# Patient Record
Sex: MALE | Race: BLACK OR AFRICAN AMERICAN | URBAN - METROPOLITAN AREA
[De-identification: names, ages, dates, MRNs, and addresses within clinical notes are randomized per-mention and may not be internally consistent; named-entity substitution may affect disease eponyms.]

---

## 2018-01-18 NOTE — PATIENT DISCUSSION
CATARACTS, OU: VISUALLY SIGNIFICANT. OPTION OF SURGERY VERSUS FOLLOWING VERSUS UPDATING GLASSES DISCUSSED. RBA'S DISCUSSED, PATIENT UNDERSTANDS AND DESIRES SURGERY TO INCREASE VISION FOR READING, WATCHING TV AND GLARE FROM LIGHTS. SCHEDULE CATARACT SURGERY/PRE-OP OU.

## 2018-02-19 NOTE — PATIENT DISCUSSION
CATARACT, OS: VISUALLY SIGNIFICANT. OPTION OF SURGERY VERSUS FOLLOWING VERSUS UPDATING GLASSES DISCUSSED. RBA'S DISCUSSED TODAY WITH DR. MONROE, PATIENT UNDERSTANDS AND DESIRES SURGERY TO IMPROVE COLORS AND REDUCE GLARE.  RTC FOR CAT SX OS

## 2018-03-06 NOTE — PATIENT DISCUSSION
(V94.6023) Primary open-angle glaucoma bilateral mild stage - Assesment : Examination revealed Primary Open Angle Glaucoma. -MILD  INCREASED C/D  LIKELY HISTORY NTG  PATIENT HAS BEEN USING LATANOPROST OU QD X 3 YEARS  HIGHEST IOP PRIOR TO GTT TX WAS 14- PER PT - Plan : Monitor for changes. Advised patient to call our office when decreased vision or increased eye pain.  CPM: LATANOPROST OU QD  24-2 OU ON DAY OF A SCAN

## 2018-03-06 NOTE — PATIENT DISCUSSION
(H25.13) Age-related nuclear cataract, bilateral - Assesment : Examination revealed cataract. OU: 2+ NSC WITH CORTICAL VACUOLES  ADVISED PATIENT HIS CATARACTS HAVE PROGRESSED.  - Plan : SX COUNSELING - 24-2 VF ON DAY OF A-SCAN  **OBTAIN RECORDS RELEASE**

## 2018-05-02 NOTE — PATIENT DISCUSSION
(Y31.565) Vitreous degeneration, bilateral - Assesment : Examination revealed PVD - Plan : Monitor for changes. Advised patient to call our office with decreased vision or an increase in flashes and/or floaters.

## 2018-05-02 NOTE — PATIENT DISCUSSION
(H25.13) Age-related nuclear cataract, bilateral - Assesment : Examination reveals moderate cataract, patient is symptomatic with visual function affected. Diagnoses discussed in depth with patient,patient symptomatic and bothered at this time Advised patient testing performed today shows astigmatism present. Option of correcting astigmatism with singele focus Toric vs MF Toric IOL at the time of CE. Advised patient with single focus toric he will need glasses for reading Patient can consider ROF-Toric which will correct the distance,intermediate and some near, he may need some reading glasses for smaller print or if reading for longer periods of time or if reading under dimmer lighting. Advised patient about halos/start burst around headlight at night with ROF iol - Plan : Recommend package with advanced technology for the management and treatment of astigmatism and/or to aid in the management of presbyopia with blended vision, mono vision, or multifocal IOL. The astigmatism management may include Toric IOL placed intraoperatively, or LRI intraoperatively or as included postoperative treatment, or PRK treatment postoperatively. Risks, Benefits and Alternatives were discussed with patient at length for Cataract Surgery. Visual symptoms are consistent with Cataract findings on examination and current refraction no longer provides satisfactory vision. Patient understands and desires surgery. All questions answered. Risks, Benefits and Alternatives discussed at length for IOL placement. Patient will need to wear glasses for smaller print. EYE: OS IOL TYPE:Monofocal POST OPERATIVE TARGET:Pl/-0.50 DR RECOMMENDED PACKAGE: Toric PT PREFERRED PACKAGE:  Toric Lens Patient to see surgery counselor today.  OD to possibly follow

## 2018-05-03 NOTE — PATIENT DISCUSSION
(T06.9554) Primary open-angle glaucoma bilateral mild stage - Assesment : Examination revealed Primary Open Angle Glaucoma based on increased c/d.-MILD  LIKELY HISTORY NTG  Iop stable today - Plan : Monitor for changes. Advised patient to call our office when decreased vision or increased eye pain.  CPM: LATANOPROST OU QD

## 2018-05-23 NOTE — PATIENT DISCUSSION
(H25.11) Age-related nuclear cataract, right eye - Assesment : Examination reveals moderate cataract, patient is symptomatic with visual function affected. Diagnoses discussed in depth with patient,patient symptomatic and bothered at this time Advised patient testing performed today shows astigmatism present. Option of correcting astigmatism with singele focus Toric vs MF Toric IOL at the time of CE. Advised patient with single focus toric he will need glasses for reading Patient can consider ROF-Toric which will correct the distance,intermediate and some near, he may need some reading glasses for smaller print or if reading for longer periods of time or if reading under dimmer lighting. Advised patient about halos/start burst around headlight at night with ROF iol - Plan : Recommend package with advanced technology for the management and treatment of astigmatism and/or to aid in the management of presbyopia with blended vision, mono vision, or multifocal IOL. The astigmatism management may include Toric IOL placed intraoperatively, or LRI intraoperatively or as included postoperative treatment, or PRK treatment postoperatively. Risks, Benefits and Alternatives were discussed with patient at length for Cataract Surgery. Visual symptoms are consistent with Cataract findings on examination and current refraction no longer provides satisfactory vision. Patient understands and desires surgery. All questions answered. Risks, Benefits and Alternatives discussed at length for IOL placement. Patient will need to wear glasses for smaller print. EYE: OD IOL TYPE:Monofocal POST OPERATIVE TARGET:Pl/-0.50 DR RECOMMENDED PACKAGE: Toric PT PREFERRED PACKAGE:  Toric Lens Patient to see surgery counselor today.

## 2018-05-23 NOTE — PATIENT DISCUSSION
(Z96.1) Presence of intraocular lens - Assesment : Patient is Pseudophakic. ORA done in OR during surgery 1 drop of Combigan inserted in patients eye w/o complications - Plan : Discussed signs and symptoms of infection and retinal detachments. Do not rub operated eye. Follow drop schedule If redness,pain,decreased vision, flashes or floaters occur then contact clinic.

## 2018-05-30 NOTE — PATIENT DISCUSSION
(Z96.1) Presence of intraocular lens - Assesment : Patient is Pseudophakic. PC IOL well centered @ 66 - Plan : Signs and symptoms of infection and retinal detachment are outlined in your surgical packet. Do not rub operated eye. Follow drop schedule. If redness, pain, decreased vision, flashes or floaters occur then contact clinic.

## 2018-05-30 NOTE — PATIENT DISCUSSION
(H25.11) Age-related nuclear cataract, right eye - Assesment : Examination reveals moderate cataract, patient is symptomatic with visual function affected. Diagnoses discussed in depth with patient,patient symptomatic and bothered at this time Advised patient testing performed today shows astigmatism present. Option of correcting astigmatism with singele focus Toric vs MF Toric IOL at the time of CE. Advised patient with single focus toric he will need glasses for reading Patient can consider ROF-Toric which will correct the distance,intermediate and some near, he may need some reading glasses for smaller print or if reading for longer periods of time or if reading under dimmer lighting. Advised patient about halos/start burst around headlight at night with ROF iol - Plan : Recommend package with advanced technology for the management and treatment of astigmatism and/or to aid in the management of presbyopia with blended vision, mono vision, or multifocal IOL. The astigmatism management may include Toric IOL placed intraoperatively, or LRI intraoperatively or as included postoperative treatment, or PRK treatment postoperatively. Risks, Benefits and Alternatives were discussed with patient at length for Cataract Surgery. Visual symptoms are consistent with Cataract findings on examination and current refraction no longer provides satisfactory vision. Patient understands and desires surgery. All questions answered. Risks, Benefits and Alternatives discussed at length for IOL placement. Patient will need to wear glasses for smaller print.   EYE: OD IOL TYPE:Monofocal POST OPERATIVE TARGET:Pl/-0.50 DR RECOMMENDED PACKAGE: Toric PT PREFERRED PACKAGE:  Toric Lens

## 2018-06-06 NOTE — PATIENT DISCUSSION
(Z96.1) Presence of intraocular lens - Assesment : Patient is Pseudophakic. ORA was done in OR on operated eye. MILD K EDEMA  OD STILL DILATED - Plan : Discussed signs and symptoms of infection and retinal detachments. Do not rub operated eye.  Follow drop schedule If redness,pain,decreased vision, flashes or floaters occur then contact clinic. 1 WEEK

## 2018-06-12 NOTE — PATIENT DISCUSSION
(Z96.1) Presence of intraocular lens - Assesment : Patient is Pseudophakic. - Plan : Signs and symptoms of infection and retinal detachment are outlined in your surgical packet. Do not rub operated eye. Follow drop schedule. If redness, pain, decreased vision, flashes or floaters occur then contact clinic. **D/C LATANOPROST 1 WEEK BEFORE NEXT VISIT.   3-4 week final post op/refraction/octm

## 2018-07-20 ENCOUNTER — IMPORTED ENCOUNTER (OUTPATIENT)
Dept: URBAN - METROPOLITAN AREA CLINIC 9 | Facility: CLINIC | Age: 83
End: 2018-07-20

## 2018-09-20 NOTE — PATIENT DISCUSSION
(Z96.1) Presence of intraocular lens - Assesment : Patient is Pseudophakic OU. - Plan : Signs and symptoms of infection and retinal detachment are outlined in your surgical packet. Do not rub operated eye. Follow drop schedule. If redness, pain, decreased vision, flashes or floaters occur then contact clinic. OK to D/C Latanoprost until next appointment. RV in 3-4 Months for Tension Check, sooner with problems or changes in vision.

## 2019-02-01 NOTE — PATIENT DISCUSSION
(Z03.4611) Primary open-angle glaucoma bilateral mild stage - Assesment : Examination revealed Primary Open Angle Glaucoma based on increased c/d.-MILD  Iop stable today HX OF THIN CORNEAS OU ADVISED PATIENT THAT HE COULD HAVE LOW PRESSURES AND STILL HAVE GLAUCOMA. RECOMMEND PATIENT HAVE IOP CHECKED REGULARLY - Plan : Monitor for changes. Advised patient to call our office when decreased vision or increased eye pain.  PATIENT HAS D/C LATANOPROST.  6 MONTHS 24-2 VF/ ONH OCT

## 2019-02-01 NOTE — PATIENT DISCUSSION
Patient achieved a 15% reduction in IOP from pretreatment levels or a plan is in place to achieve this goal.

## 2019-08-29 ENCOUNTER — IMPORTED ENCOUNTER (OUTPATIENT)
Dept: URBAN - METROPOLITAN AREA CLINIC 9 | Facility: CLINIC | Age: 84
End: 2019-08-29

## 2020-10-02 ENCOUNTER — IMPORTED ENCOUNTER (OUTPATIENT)
Dept: URBAN - METROPOLITAN AREA CLINIC 9 | Facility: CLINIC | Age: 85
End: 2020-10-02

## 2021-09-27 ENCOUNTER — IMPORTED ENCOUNTER (OUTPATIENT)
Dept: URBAN - METROPOLITAN AREA CLINIC 9 | Facility: CLINIC | Age: 86
End: 2021-09-27

## 2021-10-18 ASSESSMENT — VISUAL ACUITY
OS_CC: 20/25 - SN
OD_CC: 20/30 + SN
OS_CC: 20/40 + SN
OD_CC: 20/25 SN
OD_CC: 20/25 SN
OS_CC: 20/20 + SN
OD_CC: 20/30 -2 SN
OS_CC: 20/25 SN
OS_CC: 20/20 -2 SN
OS_CC: 20/30 SN
OD_CC: 20/20 SN
OD_CC: 20/25 -2 SN
OD_SC: 20/25 - SN
OS_SC: 20/25 SN

## 2021-10-18 ASSESSMENT — TONOMETRY
OS_IOP_MMHG: 15
OD_IOP_MMHG: 16
OD_IOP_MMHG: 14
OD_IOP_MMHG: 17
OS_IOP_MMHG: 11
OS_IOP_MMHG: 10
OS_IOP_MMHG: 18
OD_IOP_MMHG: 9

## 2022-09-15 ENCOUNTER — ESTABLISHED PATIENT (OUTPATIENT)
Dept: URBAN - METROPOLITAN AREA CLINIC 4 | Facility: CLINIC | Age: 87
End: 2022-09-15

## 2022-09-15 DIAGNOSIS — H04.123: ICD-10-CM

## 2022-09-15 DIAGNOSIS — H40.013: ICD-10-CM

## 2022-09-15 DIAGNOSIS — H52.4: ICD-10-CM

## 2022-09-15 DIAGNOSIS — H43.813: ICD-10-CM

## 2022-09-15 PROCEDURE — 92134 CPTRZ OPH DX IMG PST SGM RTA: CPT

## 2022-09-15 PROCEDURE — 92015 DETERMINE REFRACTIVE STATE: CPT

## 2022-09-15 PROCEDURE — 99214 OFFICE O/P EST MOD 30 MIN: CPT

## 2022-09-15 PROCEDURE — 92020 GONIOSCOPY: CPT

## 2022-09-15 PROCEDURE — 92250 FUNDUS PHOTOGRAPHY W/I&R: CPT

## 2022-09-15 ASSESSMENT — TONOMETRY
OD_IOP_MMHG: 15
OS_IOP_MMHG: 16

## 2023-09-20 ENCOUNTER — ESTABLISHED PATIENT (OUTPATIENT)
Dept: URBAN - METROPOLITAN AREA CLINIC 4 | Facility: CLINIC | Age: 88
End: 2023-09-20

## 2023-09-20 DIAGNOSIS — H43.813: ICD-10-CM

## 2023-09-20 DIAGNOSIS — H52.4: ICD-10-CM

## 2023-09-20 DIAGNOSIS — H40.013: ICD-10-CM

## 2023-09-20 DIAGNOSIS — H01.02A: ICD-10-CM

## 2023-09-20 DIAGNOSIS — H04.123: ICD-10-CM

## 2023-09-20 DIAGNOSIS — H01.02B: ICD-10-CM

## 2023-09-20 PROCEDURE — 92020 GONIOSCOPY: CPT

## 2023-09-20 PROCEDURE — 92014 COMPRE OPH EXAM EST PT 1/>: CPT

## 2023-09-20 PROCEDURE — 92133 CPTRZD OPH DX IMG PST SGM ON: CPT

## 2023-09-20 ASSESSMENT — TONOMETRY
OD_IOP_MMHG: 16
OS_IOP_MMHG: 16

## 2024-02-15 ENCOUNTER — ESTABLISHED PATIENT (OUTPATIENT)
Facility: LOCATION | Age: 89
End: 2024-02-15

## 2024-02-15 DIAGNOSIS — H40.013: ICD-10-CM

## 2024-02-15 PROCEDURE — 92012 INTRM OPH EXAM EST PATIENT: CPT

## 2024-02-15 PROCEDURE — 92083 EXTENDED VISUAL FIELD XM: CPT

## 2024-02-15 ASSESSMENT — TONOMETRY
OS_IOP_MMHG: 15
OD_IOP_MMHG: 15

## 2024-02-15 ASSESSMENT — VISUAL ACUITY
OS_GLARE: 20/30
OD_SC: 20/20
OS_SC: 20/20
OD_GLARE: 20/30

## 2024-03-06 NOTE — PATIENT DISCUSSION
Received report per Keyonna RICO.    S/P PE IOL, OS. STABLE. CONTINUE DROPS AS DIRECTED. RETURN FOR FOLLOW-UP IN 2 WEEKS.

## 2024-09-12 ENCOUNTER — COMPREHENSIVE EXAM (OUTPATIENT)
Facility: LOCATION | Age: 89
End: 2024-09-12

## 2024-09-12 DIAGNOSIS — H43.813: ICD-10-CM

## 2024-09-12 DIAGNOSIS — H04.123: ICD-10-CM

## 2024-09-12 DIAGNOSIS — H01.02B: ICD-10-CM

## 2024-09-12 DIAGNOSIS — H52.4: ICD-10-CM

## 2024-09-12 DIAGNOSIS — H01.02A: ICD-10-CM

## 2024-09-12 DIAGNOSIS — H40.013: ICD-10-CM

## 2024-09-12 PROCEDURE — 92015 DETERMINE REFRACTIVE STATE: CPT

## 2024-09-12 PROCEDURE — 92014 COMPRE OPH EXAM EST PT 1/>: CPT

## 2024-09-12 PROCEDURE — 92133 CPTRZD OPH DX IMG PST SGM ON: CPT

## 2025-03-21 ENCOUNTER — DIAGNOSTICS ONLY (OUTPATIENT)
Age: 89
End: 2025-03-21

## 2025-03-21 DIAGNOSIS — H40.013: ICD-10-CM

## 2025-03-21 PROCEDURE — 92083 EXTENDED VISUAL FIELD XM: CPT
